# Patient Record
(demographics unavailable — no encounter records)

---

## 2019-12-16 DIAGNOSIS — K20.0 EOSINOPHILIC ESOPHAGITIS: Primary | ICD-10-CM

## 2019-12-16 RX ORDER — OMEPRAZOLE 40 MG/1
CAPSULE, DELAYED RELEASE ORAL
Qty: 30 CAPSULE | Refills: 12 | Status: SHIPPED | OUTPATIENT
Start: 2019-12-16 | End: 2021-02-22

## 2021-01-04 DIAGNOSIS — K20.0 EOSINOPHILIC ESOPHAGITIS: ICD-10-CM

## 2021-01-14 NOTE — TELEPHONE ENCOUNTER
Patient not seen since October 2018  Needs appt    [phone] is phone # in [facility]  "voicemail box not set up yet"

## 2021-01-14 NOTE — TELEPHONE ENCOUNTER
Message left at [facility] pharmacy that patient needs appt for refills and that we could not leave message for him

## 2021-02-22 RX ORDER — OMEPRAZOLE 40 MG/1
40 CAPSULE, DELAYED RELEASE ORAL DAILY
Qty: 30 CAPSULE | Refills: 12 | OUTPATIENT
Start: 2021-02-22